# Patient Record
Sex: MALE | Race: WHITE | Employment: UNEMPLOYED | URBAN - NONMETROPOLITAN AREA
[De-identification: names, ages, dates, MRNs, and addresses within clinical notes are randomized per-mention and may not be internally consistent; named-entity substitution may affect disease eponyms.]

---

## 2017-01-04 ENCOUNTER — COMMUNICATION - GICH (OUTPATIENT)
Dept: FAMILY MEDICINE | Facility: OTHER | Age: 62
End: 2017-01-04

## 2017-01-04 DIAGNOSIS — M70.61 TROCHANTERIC BURSITIS OF RIGHT HIP: ICD-10-CM

## 2017-01-25 ENCOUNTER — HOSPITAL ENCOUNTER (OUTPATIENT)
Dept: RADIOLOGY | Facility: OTHER | Age: 62
End: 2017-01-25
Attending: FAMILY MEDICINE

## 2017-01-25 ENCOUNTER — HISTORY (OUTPATIENT)
Dept: FAMILY MEDICINE | Facility: OTHER | Age: 62
End: 2017-01-25

## 2017-01-25 ENCOUNTER — OFFICE VISIT - GICH (OUTPATIENT)
Dept: FAMILY MEDICINE | Facility: OTHER | Age: 62
End: 2017-01-25

## 2017-01-25 DIAGNOSIS — S80.02XA CONTUSION OF LEFT KNEE: ICD-10-CM

## 2017-02-02 ENCOUNTER — COMMUNICATION - GICH (OUTPATIENT)
Dept: FAMILY MEDICINE | Facility: OTHER | Age: 62
End: 2017-02-02

## 2017-02-02 DIAGNOSIS — E11.9 TYPE 2 DIABETES MELLITUS WITHOUT COMPLICATIONS (H): ICD-10-CM

## 2017-02-02 DIAGNOSIS — S80.02XD CONTUSION OF LEFT KNEE, SUBSEQUENT ENCOUNTER: ICD-10-CM

## 2017-02-17 ENCOUNTER — COMMUNICATION - GICH (OUTPATIENT)
Dept: FAMILY MEDICINE | Facility: OTHER | Age: 62
End: 2017-02-17

## 2017-02-22 ENCOUNTER — COMMUNICATION - GICH (OUTPATIENT)
Dept: FAMILY MEDICINE | Facility: OTHER | Age: 62
End: 2017-02-22

## 2017-02-22 DIAGNOSIS — Z00.00 ENCOUNTER FOR GENERAL ADULT MEDICAL EXAMINATION WITHOUT ABNORMAL FINDINGS: ICD-10-CM

## 2017-02-22 DIAGNOSIS — G47.00 INSOMNIA: ICD-10-CM

## 2017-02-22 DIAGNOSIS — I10 ESSENTIAL (PRIMARY) HYPERTENSION: ICD-10-CM

## 2017-02-22 DIAGNOSIS — M51.369 OTHER INTERVERTEBRAL DISC DEGENERATION, LUMBAR REGION: ICD-10-CM

## 2017-02-24 ENCOUNTER — HOSPITAL ENCOUNTER (OUTPATIENT)
Dept: RADIOLOGY | Facility: OTHER | Age: 62
End: 2017-02-24
Attending: NURSE PRACTITIONER

## 2017-02-24 ENCOUNTER — HISTORY (OUTPATIENT)
Dept: FAMILY MEDICINE | Facility: OTHER | Age: 62
End: 2017-02-24

## 2017-02-24 ENCOUNTER — OFFICE VISIT - GICH (OUTPATIENT)
Dept: FAMILY MEDICINE | Facility: OTHER | Age: 62
End: 2017-02-24

## 2017-02-24 ENCOUNTER — COMMUNICATION - GICH (OUTPATIENT)
Dept: FAMILY MEDICINE | Facility: OTHER | Age: 62
End: 2017-02-24

## 2017-02-24 DIAGNOSIS — R06.09 OTHER FORMS OF DYSPNEA: ICD-10-CM

## 2017-02-24 DIAGNOSIS — J98.01 ACUTE BRONCHOSPASM: ICD-10-CM

## 2017-02-24 DIAGNOSIS — J40 BRONCHITIS: ICD-10-CM

## 2017-02-27 ENCOUNTER — COMMUNICATION - GICH (OUTPATIENT)
Dept: FAMILY MEDICINE | Facility: OTHER | Age: 62
End: 2017-02-27

## 2017-02-27 DIAGNOSIS — K21.9 GASTRO-ESOPHAGEAL REFLUX DISEASE WITHOUT ESOPHAGITIS: ICD-10-CM

## 2017-02-28 ENCOUNTER — COMMUNICATION - GICH (OUTPATIENT)
Dept: FAMILY MEDICINE | Facility: OTHER | Age: 62
End: 2017-02-28

## 2017-03-23 ENCOUNTER — COMMUNICATION - GICH (OUTPATIENT)
Dept: FAMILY MEDICINE | Facility: OTHER | Age: 62
End: 2017-03-23

## 2017-03-23 DIAGNOSIS — G47.00 INSOMNIA: ICD-10-CM

## 2017-03-23 DIAGNOSIS — Z29.9 ENCOUNTER FOR PREVENTIVE MEASURE: ICD-10-CM

## 2017-03-23 DIAGNOSIS — Z00.00 ENCOUNTER FOR GENERAL ADULT MEDICAL EXAMINATION WITHOUT ABNORMAL FINDINGS: ICD-10-CM

## 2017-04-03 ENCOUNTER — COMMUNICATION - GICH (OUTPATIENT)
Dept: FAMILY MEDICINE | Facility: OTHER | Age: 62
End: 2017-04-03

## 2017-04-05 ENCOUNTER — AMBULATORY - GICH (OUTPATIENT)
Dept: SCHEDULING | Facility: OTHER | Age: 62
End: 2017-04-05

## 2017-04-06 ENCOUNTER — HISTORY (OUTPATIENT)
Dept: FAMILY MEDICINE | Facility: OTHER | Age: 62
End: 2017-04-06

## 2017-04-06 ENCOUNTER — COMMUNICATION - GICH (OUTPATIENT)
Dept: FAMILY MEDICINE | Facility: OTHER | Age: 62
End: 2017-04-06

## 2017-04-06 ENCOUNTER — OFFICE VISIT - GICH (OUTPATIENT)
Dept: FAMILY MEDICINE | Facility: OTHER | Age: 62
End: 2017-04-06

## 2017-04-06 DIAGNOSIS — F40.240 CLAUSTROPHOBIA: ICD-10-CM

## 2017-04-06 DIAGNOSIS — R29.898 OTHER SYMPTOMS AND SIGNS INVOLVING THE MUSCULOSKELETAL SYSTEM: ICD-10-CM

## 2017-04-12 ENCOUNTER — COMMUNICATION - GICH (OUTPATIENT)
Dept: FAMILY MEDICINE | Facility: OTHER | Age: 62
End: 2017-04-12

## 2017-04-13 ENCOUNTER — COMMUNICATION - GICH (OUTPATIENT)
Dept: FAMILY MEDICINE | Facility: OTHER | Age: 62
End: 2017-04-13

## 2017-04-13 ENCOUNTER — OFFICE VISIT - GICH (OUTPATIENT)
Dept: FAMILY MEDICINE | Facility: OTHER | Age: 62
End: 2017-04-13

## 2017-04-13 ENCOUNTER — HISTORY (OUTPATIENT)
Dept: FAMILY MEDICINE | Facility: OTHER | Age: 62
End: 2017-04-13

## 2017-04-13 DIAGNOSIS — E11.9 TYPE 2 DIABETES MELLITUS WITHOUT COMPLICATIONS (H): ICD-10-CM

## 2017-04-13 DIAGNOSIS — M51.369 OTHER INTERVERTEBRAL DISC DEGENERATION, LUMBAR REGION: ICD-10-CM

## 2017-04-13 DIAGNOSIS — Z00.00 ENCOUNTER FOR GENERAL ADULT MEDICAL EXAMINATION WITHOUT ABNORMAL FINDINGS: ICD-10-CM

## 2017-04-14 ENCOUNTER — COMMUNICATION - GICH (OUTPATIENT)
Dept: FAMILY MEDICINE | Facility: OTHER | Age: 62
End: 2017-04-14

## 2017-04-14 DIAGNOSIS — I10 ESSENTIAL (PRIMARY) HYPERTENSION: ICD-10-CM

## 2017-04-14 DIAGNOSIS — M51.369 OTHER INTERVERTEBRAL DISC DEGENERATION, LUMBAR REGION: ICD-10-CM

## 2017-04-19 ENCOUNTER — COMMUNICATION - GICH (OUTPATIENT)
Dept: FAMILY MEDICINE | Facility: OTHER | Age: 62
End: 2017-04-19

## 2017-04-19 DIAGNOSIS — M51.369 OTHER INTERVERTEBRAL DISC DEGENERATION, LUMBAR REGION: ICD-10-CM

## 2017-05-16 ENCOUNTER — COMMUNICATION - GICH (OUTPATIENT)
Dept: FAMILY MEDICINE | Facility: OTHER | Age: 62
End: 2017-05-16

## 2017-05-16 DIAGNOSIS — K21.9 GASTRO-ESOPHAGEAL REFLUX DISEASE WITHOUT ESOPHAGITIS: ICD-10-CM

## 2017-05-16 DIAGNOSIS — Z00.00 ENCOUNTER FOR GENERAL ADULT MEDICAL EXAMINATION WITHOUT ABNORMAL FINDINGS: ICD-10-CM

## 2017-06-07 ENCOUNTER — COMMUNICATION - GICH (OUTPATIENT)
Dept: FAMILY MEDICINE | Facility: OTHER | Age: 62
End: 2017-06-07

## 2017-06-07 DIAGNOSIS — Z00.00 ENCOUNTER FOR GENERAL ADULT MEDICAL EXAMINATION WITHOUT ABNORMAL FINDINGS: ICD-10-CM

## 2017-06-07 DIAGNOSIS — M70.61 TROCHANTERIC BURSITIS OF RIGHT HIP: ICD-10-CM

## 2017-06-29 ENCOUNTER — COMMUNICATION - GICH (OUTPATIENT)
Dept: FAMILY MEDICINE | Facility: OTHER | Age: 62
End: 2017-06-29

## 2017-06-29 DIAGNOSIS — E11.9 TYPE 2 DIABETES MELLITUS WITHOUT COMPLICATIONS (H): ICD-10-CM

## 2017-06-29 DIAGNOSIS — Z00.00 ENCOUNTER FOR GENERAL ADULT MEDICAL EXAMINATION WITHOUT ABNORMAL FINDINGS: ICD-10-CM

## 2017-06-29 DIAGNOSIS — Z29.9 ENCOUNTER FOR PREVENTIVE MEASURE: ICD-10-CM

## 2017-06-29 DIAGNOSIS — I10 ESSENTIAL (PRIMARY) HYPERTENSION: ICD-10-CM

## 2017-06-29 DIAGNOSIS — K21.9 GASTRO-ESOPHAGEAL REFLUX DISEASE WITHOUT ESOPHAGITIS: ICD-10-CM

## 2017-06-29 DIAGNOSIS — M51.369 OTHER INTERVERTEBRAL DISC DEGENERATION, LUMBAR REGION: ICD-10-CM

## 2017-07-04 ENCOUNTER — COMMUNICATION - GICH (OUTPATIENT)
Dept: FAMILY MEDICINE | Facility: OTHER | Age: 62
End: 2017-07-04

## 2017-07-04 DIAGNOSIS — I10 ESSENTIAL (PRIMARY) HYPERTENSION: ICD-10-CM

## 2017-09-20 ENCOUNTER — COMMUNICATION - GICH (OUTPATIENT)
Dept: FAMILY MEDICINE | Facility: OTHER | Age: 62
End: 2017-09-20

## 2017-09-20 DIAGNOSIS — K21.9 GASTRO-ESOPHAGEAL REFLUX DISEASE WITHOUT ESOPHAGITIS: ICD-10-CM

## 2017-12-28 NOTE — TELEPHONE ENCOUNTER
Patient Information     Patient Name MRN Regan Vincent [id number] Male 1955      Telephone Encounter by Inge Cuevas RN at 7/3/2017  3:40 PM     Author:  Inge Cuevas RN Service:  (none) Author Type:  NURS- Registered Nurse     Filed:  7/3/2017  3:43 PM Encounter Date:  2017 Status:  Signed     :  Inge Cuevas RN (NURS- Registered Nurse)            Office visit in the past 12 months or per provider note.    Last visit with DARIO SCRUGGS was on: 2017 in [facility]  Next visit with DARIO SCRUGGS is on: No future appointment listed with this provider  Next visit with Family Practice is on: No future appointment listed in this department    Max refill for 12 months from last office visit or per provider note.    Biguanides    Office visit in the past 12 months or per provider note.    Last visit with DARIO SCRUGGS was on: 2017 in [facility]  Next visit with DARIO SCRUGGS is on: No future appointment listed with this provider  Next visit with Family Practice is on: No future appointment listed in this department    Lab test requirements:  HgbA1c annually or per provider note.  HEMOGLOBIN A1C MONITORING (POCT)    Date Value   2016 7.1 % (H)   2013 6.7 % NGSP (H)       Max refill for 12 months from last office visit or per provider note.    If taking for polycystic ovary disease, may refill for 12 months.  FU in 3-4 mos after labs per [facility]. Prescription refilled per RN Medication Refill Policy.................... INGE CUEVAS RN ....................  7/3/2017   3:41 PM

## 2017-12-28 NOTE — TELEPHONE ENCOUNTER
Patient Information     Patient Name MRN Regan Vincent [id number] Male 1955      Telephone Encounter by Maude Burgos RN at 2017  8:48 AM     Author:  Maude Burgos RN Service:  (none) Author Type:  NURS- Registered Nurse     Filed:  2017  9:03 AM Encounter Date:  2017 Status:  Signed     :  Maude Burgos RN (NURS- Registered Nurse)            Attempted to contact Patient without success. Noted from office visit on 17, Patient has moved to [place].     Proton Pump Inhibitors    Office visit in the past 12 months or per provider note.    Last visit with DARIO SCRUGGS was on: 2017 in [facility]  Next visit with DARIO SCRUGGS is on: No future appointment listed with this provider  Next visit with Family Practice is on: No future appointment listed in this department    Max refill for 12 months from last office visit or per provider note.    Prescription refilled per RN Medication Refill Policy.................... Maude Burgos RN ....................  2017   9:01 AM          .

## 2018-01-02 NOTE — TELEPHONE ENCOUNTER
Patient Information     Patient Name MRN Regan Vincent [id number] Male 1955      Telephone Encounter by Ange Carr RN at 2017  2:58 PM     Author:  Ange Carr RN Service:  (none) Author Type:  NURS- Registered Nurse     Filed:  2017  3:02 PM Encounter Date:  2017 Status:  Signed     :  Ange Carr RN (NURS- Registered Nurse)            Office visit in the past 12 months or per provider note.    Last visit with DARIO SCRUGGS was on: 10/19/2016 in [facility]  Next visit with DARIO SCRUGGS is on: No future appointment listed with this provider  Next visit with Family Practice is on: No future appointment listed in this department    Lab test requirements:  Annual creatinine.  CREATININE (mg/dL)    Date Value   2016 1.02       Max refill for 12 months from last office visit or per provider note.    Prescription refilled per RN Medication Refill Policy.................... Ange Carr RN ....................  2017   3:02 PM

## 2018-01-03 NOTE — TELEPHONE ENCOUNTER
Patient Information     Patient Name MRN Regan Vincent [id number] Male 1955      Telephone Encounter by Nicole Hou at 2017  8:11 AM     Author:  Nicole Hou Service:  (none) Author Type:  (none)     Filed:  2017  8:13 AM Encounter Date:  2017 Status:  Signed     :  Nicole Hou            Spoke with patient he wanted an antibiotic called into the pharmacy. I did let him know that his PCP is not in clinic today and encouraged him to schedule an appt. His cough sounded very deep, transferred to the appt line. Nicole Hou LPN .......................2017  8:13 AM

## 2018-01-03 NOTE — TELEPHONE ENCOUNTER
Patient Information     Patient Name MRN Sex Regan Cisneros [id number] Male 1955      Telephone Encounter by Ange Carr RN at 2017 12:44 PM     Author:  Ange Carr RN Service:  (none) Author Type:  NURS- Registered Nurse     Filed:  2017 12:55 PM Encounter Date:  2017 Status:  Signed     :  Ange Carr RN (NURS- Registered Nurse)            Nsaids    Office visit in the past 12 months or per provider note.    Last visit with DARIO SCRUGGS was on: 2017 in [facility]  Next visit with DARIO SCRUGGS is on: No future appointment listed with this provider  Next visit with Family Practice is on: No future appointment listed in this department    Max refill for 12 months from last office visit or per provider note.    Diuretic Combinations    Office visit in the past 12 months or per provider note.    Lab test requirements:  Creatinine and Potassium annually, if ordering lab, order BMP.  CREATININE (mg/dL)    Date Value   2016 1.02     POTASSIUM (mmol/L)    Date Value   2016 3.8       Max refill for 12 months from last office visit or per provider note.    Office visit in the past 12 months or per provider note.    Max refill for 12 months from last office visit or per provider note.    Beta Blockers     Office visit in the past 12 months or per provider note.    Max refill for 12 months from last office visit or per provider note.    Patient is due for medication management appointment. Limited refill provided at this time and letter sent for reminder to patient. Prescription refilled per RN Medication Refill Policy.................... Ange Carr RN ....................  2017   12:47 PM

## 2018-01-03 NOTE — TELEPHONE ENCOUNTER
Patient Information     Patient Name MRN Regan Vincent [id number] Male 1955      Telephone Encounter by Ange Carr RN at 2017 10:42 AM     Author:  Ange Carr RN Service:  (none) Author Type:  NURS- Registered Nurse     Filed:  2017 10:44 AM Encounter Date:  2017 Status:  Signed     :  Ange Carr RN (NURS- Registered Nurse)            Proton Pump Inhibitors    Office visit in the past 12 months or per provider note.    Last visit with DARIO SCRUGGS was on: 2017 in [facility]  Next visit with DARIO SCRUGGS is on: No future appointment listed with this provider  Next visit with Family Practice is on: No future appointment listed in this department    Max refill for 12 months from last office visit or per provider note.    Patient is due for medication management appointment. Limited refill provided at this time and letter recently sent for reminder to patient. Prescription refilled per RN Medication Refill Policy.................... Ange Carr RN ....................  2017   10:43 AM

## 2018-01-03 NOTE — TELEPHONE ENCOUNTER
Patient Information     Patient Name MRN Regan Vincent [id number] Male 1955      Telephone Encounter by Gorge Diaz MD at 2017  2:43 PM     Author:  Gorge Diaz MD Service:  (none) Author Type:  Physician     Filed:  2017  2:43 PM Encounter Date:  2017 Status:  Signed     :  Gorge Diaz MD (Physician)            No need, just 1 dose will not really affect the blood.  Can resume aspirin.  Gorge Diaz MD ....................  2017   2:43 PM

## 2018-01-03 NOTE — TELEPHONE ENCOUNTER
Patient Information     Patient Name MRN Sex Regan Cisneros [id number] Male 1955      Telephone Encounter by Verena Wong at 2017  2:55 PM     Author:  Verena Wong Service:  (none) Author Type:  (none)     Filed:  2017  2:56 PM Encounter Date:  2017 Status:  Signed     :  Verena Wong            Patient notified and transferred to the appointment line.  Verena Wong LPN  2017  2:56 PM

## 2018-01-03 NOTE — TELEPHONE ENCOUNTER
Patient Information     Patient Name MRN Sex Regan Cisneros [id number] Male 1955      Telephone Encounter by Verena Wong at 2017 10:48 AM     Author:  Verena Wong Service:  (none) Author Type:  (none)     Filed:  2017 10:49 AM Encounter Date:  2017 Status:  Signed     :  Verena Wong            Patient notified.  Letter was mailed to [facility] per patients request.  Verena Wong LPN  2017  10:49 AM

## 2018-01-03 NOTE — TELEPHONE ENCOUNTER
Patient Information     Patient Name MRN Sex Regan Cisneros [id number] Male 1955      Telephone Encounter by Kristen Jacome at 2017  2:58 PM     Author:  Kristen Jacome Service:  (none) Author Type:  (none)     Filed:  2017  2:58 PM Encounter Date:  2017 Status:  Signed     :  Kristen Jacome            Called Margie Jacome ....................  2017   2:58 PM

## 2018-01-03 NOTE — PATIENT INSTRUCTIONS
Patient Information     Patient Name MRN Regan Vincent [id number] Male 1955      Patient Instructions by Jessica Banerjee NP at 2017 10:30 AM     Author:  Jessica Banerjee NP Service:  (none) Author Type:  PHYS- Nurse Practitioner     Filed:  2017 11:02 AM Encounter Date:  2017 Status:  Signed     :  Jessica Banerjee NP (PHYS- Nurse Practitioner)            Acute Bronchitis   ________________________________________________________________________  KEY POINTS    Acute bronchitis is swelling and irritation of the airways (bronchial tubes) which connect the windpipe to the lungs. Acute bronchitis may also be called a chest cold.    Acute bronchitis often does not need medical treatment. You may be able to treat your symptoms at home. Talk to your healthcare provider if your symptoms are severe or if you have another medical condition such as heart or lung disease or diabetes.    Drink plenty of liquids and rest at home. Ask your healthcare provider what symptoms or problems you should watch for and what to do if you have them.  ________________________________________________________________________  What is acute bronchitis?  Acute bronchitis is swelling and irritation of the airways (bronchial tubes) which connect the windpipe to the lungs. Acute bronchitis usually goes away within a few weeks with treatment. Acute bronchitis may also be called a chest cold.  A different form of bronchitis, called chronic bronchitis, is a long-term condition that causes breathing problems. Chronic bronchitis is one type of chronic obstructive pulmonary disease (COPD) and is usually caused by smoking.  What is the cause?  Acute bronchitis is usually caused by viral infections that affect the lungs. Less often, it may be a bacterial infection.  Acute bronchitis is most common during the winter or when the level of air pollution is high.  People who have a higher risk for bronchitis  include:    Infants, young children, and older adults    Smokers    People with heart disease    People with allergies, asthma, or other lung diseases  What are the symptoms?  Symptoms may include:    A deep cough with yellowish or greenish mucus    Pain in your chest when you breathe deeply or cough    Wheezing or feeling short of breath    Fever and chills    Headache    Body aches  How is it diagnosed?  Your healthcare provider will ask about your symptoms and medical history and examine you. You may have tests, such as:    Chest X-ray    Blood tests  How is it treated?  Acute bronchitis often does not need medical treatment. You may be able to treat your symptoms at home:    Get plenty of rest.    Drink plenty of clear liquids unless your healthcare provider has told you to limit liquids. Water, broth, juice, electrolyte solutions, and non-caffeinated drinks are best. If you have a fever, your body needs more liquid because you can get dehydrated.    Talk to your healthcare provider if your symptoms are severe or if you have heart disease, asthma, chronic bronchitis, kidney disease, diabetes, or another chronic medical problem. You may need to take antibiotic medicines. If you are wheezing, you may need an inhaler medicine to make it easier to breathe.  Most of the time acute bronchitis clears up in several days. Your cough may slowly get better in 1 to 4 weeks. It may take you longer to recover if:    You are a smoker.    You live in an area where air pollution is a problem.    You have a heart or lung disease, including asthma.    You have other health problems.  How can I take care of myself?  Follow the full course of treatment prescribed by your healthcare provider. In addition:    Use a humidifier to put more moisture in the air. Avoid steam vaporizers because they can cause burns. Be sure to keep the humidifier clean, as recommended in the 's instructions. It's important to keep bacteria and  mold from growing in the water container.    Drink plenty of liquids. Ask your healthcare provider about how much liquid you should drink each day.    Take cough medicine if recommended by your healthcare provider. Cover your cough.    Don t smoke, and stay away from others who are smoking.    Avoid breathing dust and chemical fumes.    Get extra rest.    Take nonprescription medicine, such as acetaminophen, ibuprofen, or naproxen to treat pain and fever. Read the label and take as directed. Unless recommended by your healthcare provider, you should not take these medicines for more than 10 days.    Nonsteroidal anti-inflammatory medicines (NSAIDs), such as ibuprofen, naproxen, and aspirin, may cause stomach bleeding and other problems. These risks increase with age.    Acetaminophen may cause liver damage or other problems. Unless recommended by your provider, don't take more than 3000 milligrams (mg) in 24 hours. To make sure you don t take too much, check other medicines you take to see if they also contain acetaminophen. Ask your provider if you need to avoid drinking alcohol while taking this medicine.  Ask your provider:    How and when you will get your test results    How long it will take to recover    If there are activities you should avoid and when you can return to your normal activities    How to take care of yourself at home    What symptoms or problems you should watch for and what to do if you have them  Make sure you know when you should come back for a checkup. Keep all appointments for provider visits or tests.  How can I help prevent acute bronchitis?  To reduce your risk of getting a lung infection:    Wash your hands often and especially after using the restroom, coughing, sneezing, or blowing your nose. Also wash your hands before eating or touching your eyes.    Stay at least 6 feet away from people who are sick, if you can.    Stay indoors as much as possible on high-pollution days.    Take  care of your health. Try to get at least 7 to 9 hours of sleep each night. Eat a healthy diet and try to keep a healthy weight. If you smoke, try to quit. If you want to drink alcohol, ask your healthcare provider how much is safe for you to drink. Learn ways to manage stress. Exercise according to your healthcare provider's instructions.

## 2018-01-03 NOTE — TELEPHONE ENCOUNTER
"Patient Information     Patient Name MRN Regan Vincent [id number] Male 1955      Telephone Encounter by Agnieszka Mckeon at 2017  2:16 PM     Author:  Agnieszka Mckeon Service:  (none) Author Type:  (none)     Filed:  2017  2:28 PM Encounter Date:  2017 Status:  Signed     :  Agnieszka Mckeon            After birth date was verified, states [employer] told him the nebulizer would not be covered.  His insurance will only cover 1 machine every 5 years.  States he will not pay out of pocket for it.  Lost his when he was at the \"group home\" and he has not been able to get it back.  He is just not going to be using his neb treatments.  Advised him to see Rigo Marshall MD to discuss other treatment options.  Refused to make an appointment at this time.  Removed the nebs from his med list.  Agnieszka Mckeon CMA (AAMA)................ 2017 2:27 PM          "

## 2018-01-03 NOTE — NURSING NOTE
Patient Information     Patient Name MRN Regan Vincent [id number] Male 1955      Nursing Note by Verena Wong at 2017  8:00 AM     Author:  Verena Wong Service:  (none) Author Type:  (none)     Filed:  2017  7:57 AM Encounter Date:  2017 Status:  Signed     :  Verena Wong            Patient presents today as he fell at work 1 week ago and injured his left knee.  Verena Wong LPN  2017  7:47 AM

## 2018-01-03 NOTE — PROGRESS NOTES
Patient Information     Patient Name MRN Sex Regan Cisneros [id number] Male 1955      Progress Notes by Rigo Marshall MD at 2017  8:00 AM     Author:  Rigo Marshall MD Service:  (none) Author Type:  Physician     Filed:  2017  8:37 AM Encounter Date:  2017 Status:  Signed     :  Rigo Marshall MD (Physician)            SUBJECTIVE:  Regan Mehta is a 61 y.o. male who presents for evaluation of a work-related left knee injury. He works at [employer]. He tripped at work on  and his left knee gave way. He fell forward landing on his left knee. Since that time has had sharp pains in the knee medially and laterally and the knee feels somewhat swollen. Knee also has been giving out at times. He has had previous arthroscopy of that knee with reinforcement of the patellar tendon following a long bout of prepatellar bursitis.    No Known Allergies and   Current Outpatient Prescriptions on File Prior to Visit       Medication  Sig Dispense Refill     aspirin 325 mg tablet TAKE 1 TABLET BY MOUTH ONCE DAILY. (EVENING) 100 tablet 3     blood sugar diagnostic strip Dispense test strips covered by the patient insurance. Test 2 times per day.  Dx Code: E11.9 100 Each 3     blood-glucose meter Dispense glucose meter, test strips and lancets covered by the patient insurance. Test 2 times per day.  Dx Code: 250.00 1 Device 0     ibuprofen (ADVIL; MOTRIN) 800 mg tablet TAKE 1 TABLET BY MOUTH THREE TIMES DAILY AS NEEDED FOR PAIN 25 tablet 2     lancets Test 2 times daily.  Dx Code: 250.00 100 Each 11     lisinopril-hydrochlorothiazide, 20-25 mg, (PRINZIDE, ZESTORETIC) 20-25 mg per tablet Take 1 tablet by mouth once daily. 90 tablet 3     Melatonin 5 mg tab TAKE 1 TABLET BY MOUTH AT BEDTIME AS NEEDED FOR SLEEP **BOTTLE** 90 tablet 3     metFORMIN (GLUCOPHAGE) 500 mg tablet TAKE 1 TABLET BY MOUTH 2 TIMES DAILY WITH MEALS. (AM,SUPPER) 180 tablet 1     metoprolol succinate  "(TOPROL XL) 100 mg Sustained-Release tablet TAKE 1 TABLET BY MOUTH ONCE DAILY. (AM) 90 tablet 3     nabumetone (RELAFEN) 500 mg tablet Take 1 tablet by mouth 2 times daily with meals. 56 tablet 7     Nebulizer Accessories [facility] As directed. 1 Each 0     Nebulizer Nebulizer, neb kit, neb cup and mask.  Medication: Albuterol neb  For home use. Length of need  for Medicare patients: Indefinite 1 Device 0     omeprazole (PRILOSEC) 20 mg Delayed-Release capsule Take 1 capsule by mouth once daily before a meal. 90 capsule 3     predniSONE (DELTASONE) 20 mg tablet Take 1 tablet by mouth once daily with a meal. 5 tablet 0     TAB-A-SAGE tablet TAKE ONE TABLET BY MOUTH ONCE DAILY (AM) 100 tablet 1     traZODone (DESYREL) 100 mg tablet TAKE 1/2 TO 1 TABLET BY MOUTH AT BEDTIME (BLISTER 1 TABLET AT BEDTIME) 30 tablet 6     VITAMIN D-3 2,000 unit capsule TAKE ONE CAPSULE BY MOUTH ONCE DAILY (AM) 90 capsule 1     Walker - 4 wheels For home use. Length of need: Longterm     4 wheel walker with seat and brakes. 1 Device 0     No current facility-administered medications on file prior to visit.        OBJECTIVE:  /88  Pulse 92  Ht 1.727 m (5' 8\")  Wt 135.6 kg (299 lb)  BMI 45.46 kg/m2  EXAM:  General Appearance: Pleasant, alert, appropriate appearance for age. No acute distress  Musculoskeletal Exam: Left knee appears grossly normal to inspection without obvious effusion. Mild diffuse tenderness noted over the patella. Medial and lateral collateral ligaments appear symmetric. Lachman's and Magui's negative. He has full strength to opposed extension at the knee. He can fully weight-bear on the left leg.    X-ray shows calcification in the patellar tendon region and postoperative changes at the patellar insertion. No fractures identified.    ASSESSMENT/Plan :      ICD-10-CM    1. Contusion of left knee, initial encounter  He will be placed on light duty at work until January 30 at which time he can resume unrestricted work. " Refer to work status report for details. He will follow-up with me in 1 week.  S80.02XA XR KNEE 3 VIEWS AP LAT SUNRISE LEFT       Rigo Marshall MD

## 2018-01-03 NOTE — TELEPHONE ENCOUNTER
Patient Information     Patient Name MRN Regan Vincent [id number] Male 1955      Telephone Encounter by Lilia Boyd at 2017 12:27 PM     Author:  Lilia Boyd Service:  (none) Author Type:  (none)     Filed:  2017 12:27 PM Encounter Date:  2017 Status:  Signed     :  Lilia Boyd MDG- PATIENT IS LOOKING TO GET PATCHES TO HELP HIM QUIT SMOKING.    Lilia Boyd ....................  2017   12:27 PM

## 2018-01-03 NOTE — TELEPHONE ENCOUNTER
Patient Information     Patient Name MRN Sex Regan Cisneros [id number] Male 1955      Telephone Encounter by Ange Carr RN at 2017  1:56 PM     Author:  Ange Carr RN Service:  (none) Author Type:  NURS- Registered Nurse     Filed:  2017  2:01 PM Encounter Date:  2017 Status:  Signed     :  Ange Carr RN (NURS- Registered Nurse)            In clinical absence of patient's primary, Rigo Marshall MD, patient is requesting that this message be sent to the Doc of the Day for consideration please.      Pharmacy sent fax stating they erroneously filled an order for Regan for warfarin 2 mg. This was delivered to him and he did take 1 tablet before he caught the error. The medication was retrieved from the patient and the pharmacist talked with him regarding side effects and what to watch for. He was also told to hold his  mg tab tonight. They are wondering if he should hold his ASA for 2-5 days due to this error. Please contact Gwen Matamoros at [facility] with any questions or further instructions.    Ange Carr RN........2017 2:00 PM

## 2018-01-03 NOTE — TELEPHONE ENCOUNTER
Patient Information     Patient Name MRN Regan Vincent [id number] Male 1955      Telephone Encounter by Verena Wong at 2017 10:24 AM     Author:  Verena Wong Service:  (none) Author Type:  (none)     Filed:  2017 10:26 AM Encounter Date:  2017 Status:  Signed     :  Verena Wong            Patient states that his left knee is not any better. He is requesting a referral to go to [facility] and he would also like a note for work to be able to miss 4-5 days. Please advise.  Verena Wong LPN  2017  10:25 AM

## 2018-01-03 NOTE — TELEPHONE ENCOUNTER
Patient Information     Patient Name MRN Regan Vincent [id number] Male 1955      Telephone Encounter by Robyn Dietrich at 2017  7:40 AM     Author:  Robyn Dietrich Service:  (none) Author Type:  (none)     Filed:  2017  7:41 AM Encounter Date:  2017 Status:  Signed     :  Robyn Dietrich - PT HAS CONCERNS ABOUT BRONCHITIS WOULD LIKE NURSE TO CALL HIM BACK        Robyn HAGEN  ....................  2017   7:41 AM

## 2018-01-03 NOTE — TELEPHONE ENCOUNTER
Patient Information     Patient Name MRN Regan Vincent [id number] Male 1955      Telephone Encounter by Masha Thapa at 2017 10:19 AM     Author:  Masha Thapa Service:  (none) Author Type:  (none)     Filed:  2017 10:20 AM Encounter Date:  2017 Status:  Signed     :  Masha Thapa            Patient is asking for a referral to [facility] for his left knee. He would like a return call.    Masha Thapa ....................  2017   10:20 AM

## 2018-01-03 NOTE — TELEPHONE ENCOUNTER
Patient Information     Patient Name MRN Sex Regan Cisneros [id number] Male 1955      Telephone Encounter by Rigo Marshall MD at 2017 10:38 AM     Author:  Rigo Marshall MD Service:  (none) Author Type:  Physician     Filed:  2017 10:39 AM Encounter Date:  2017 Status:  Signed     :  Rigo Marshall MD (Physician)            Order placed for orthopedic consultation and letter written.

## 2018-01-03 NOTE — TELEPHONE ENCOUNTER
Patient Information     Patient Name MRN Rgean Vincent [id number] Male 1955      Telephone Encounter by Rigo Marshall MD at 2017  2:39 PM     Author:  Rigo Marshall MD Service:  (none) Author Type:  Physician     Filed:  2017  2:39 PM Encounter Date:  2017 Status:  Signed     :  Rigo Marshall MD (Physician)            Patient should be seen.

## 2018-01-03 NOTE — PROGRESS NOTES
Patient Information     Patient Name MRN Sex Regan Cisneros [id number] Male 1955      Progress Notes by Jessica Banerjee NP at 2017 10:30 AM     Author:  Jessica Banerjee NP Service:  (none) Author Type:  PHYS- Nurse Practitioner     Filed:  2017  6:06 PM Encounter Date:  2017 Status:  Signed     :  Jessica Banerjee NP (PHYS- Nurse Practitioner)            Nursing Notes:   Chelle Recinos  2017 10:37 AM  Signed  Patient says he gets bronchitis every year at this time and is requesting a prescription. He also says he needs a new nebulizer .  Chelle Still LPN ....................2017  10:20 AM    SUBJECTIVE:    Regan Mehta is a 61 y.o. male who presents for Cough and congestion    Cough   This is a new problem. The current episode started in the past 7 days. The problem has been unchanged. The cough is productive of sputum. Associated symptoms include myalgias, nasal congestion, postnasal drip, rhinorrhea, shortness of breath and wheezing. Pertinent negatives include no chest pain, ear congestion, ear pain, fever, headaches, sore throat or sweats. Associated symptoms comments: Deep rolling cough, has been without his nebulizer for several months. . Nothing aggravates the symptoms. He has tried nothing for the symptoms. His past medical history is significant for bronchitis. There is no history of environmental allergies or pneumonia.       Current Outpatient Prescriptions on File Prior to Visit       Medication  Sig Dispense Refill     aspirin 325 mg tablet TAKE 1 TABLET BY MOUTH ONCE DAILY. (EVENING) 100 tablet 3     blood sugar diagnostic strip Dispense test strips covered by the patient insurance. Test 2 times per day.  Dx Code: E11.9 100 Each 3     blood-glucose meter Dispense glucose meter, test strips and lancets covered by the patient insurance. Test 2 times per day.  Dx Code: 250.00 1 Device 0     ibuprofen (ADVIL; MOTRIN) 800 mg tablet TAKE 1 TABLET BY  "MOUTH THREE TIMES DAILY AS NEEDED FOR PAIN 25 tablet 2     lancets Test 2 times daily.  Dx Code: 250.00 100 Each 11     lisinopril-hydrochlorothiazide, 20-25 mg, (PRINZIDE, ZESTORETIC) 20-25 mg per tablet Take 1 tablet by mouth once daily. 31 tablet 2     Melatonin 5 mg tab TAKE 1 TABLET BY MOUTH AT BEDTIME AS NEEDED FOR SLEEP **BOTTLE** 90 tablet 3     metFORMIN (GLUCOPHAGE) 500 mg tablet TAKE 1 TABLET BY MOUTH 2 TIMES DAILY WITH MEALS. (AM,SUPPER) 180 tablet 0     metoprolol succinate (TOPROL XL) 100 mg Sustained-Release tablet TAKE 1 TABLET BY MOUTH ONCE DAILY. (AM) 31 tablet 2     multivitamin-folic acid 0.4 mg (TAB-A-SAGE) tablet Take 1 tablet by mouth once daily. 100 tablet 0     nabumetone (RELAFEN) 500 mg tablet Take 1 tablet by mouth 2 times daily with meals. 62 tablet 2     Nebulizer Accessories misc As directed. 1 Each 0     omeprazole (PRILOSEC) 20 mg Delayed-Release capsule Take 1 capsule by mouth once daily before a meal. 90 capsule 3     traZODone (DESYREL) 100 mg tablet TAKE 1/2 TO 1 TABLET BY MOUTH AT BEDTIME (BLISTER 1 TABLET AT BEDTIME) 31 tablet 0     VITAMIN D-3 2,000 unit capsule TAKE ONE CAPSULE BY MOUTH ONCE DAILY (AM) 90 capsule 1     Walker - 4 wheels For home use. Length of need: Longterm     4 wheel walker with seat and brakes. 1 Device 0     No current facility-administered medications on file prior to visit.        REVIEW OF SYSTEMS:  Review of Systems   Constitutional: Negative for fever.   HENT: Positive for postnasal drip and rhinorrhea. Negative for ear pain and sore throat.    Respiratory: Positive for cough, shortness of breath and wheezing.    Cardiovascular: Negative for chest pain.   Musculoskeletal: Positive for myalgias.   Neurological: Negative for headaches.   Endo/Heme/Allergies: Negative for environmental allergies.       OBJECTIVE:  /80  Pulse (!) 110  Temp 97.3  F (36.3  C) (Temporal)  Ht 1.727 m (5' 8\")  Wt 134.3 kg (296 lb)  SpO2 94%  BMI 45.01 kg/m2    EXAM: "   Physical Exam   Constitutional: He is well-developed, well-nourished, and in no distress.   HENT:   Head: Normocephalic and atraumatic.   Right Ear: Tympanic membrane and ear canal normal.   Left Ear: Tympanic membrane and ear canal normal.   Nose: Nose normal.   Mouth/Throat: Uvula is midline, oropharynx is clear and moist and mucous membranes are normal.   Eyes: Conjunctivae are normal.   Neck: Neck supple.   Cardiovascular: Normal rate, regular rhythm and normal heart sounds.    Pulmonary/Chest: Effort normal. He has no decreased breath sounds. He has wheezes in the right upper field and the left upper field. He has no rhonchi. He has no rales.   Lymphadenopathy:     He has no cervical adenopathy.   Nursing note and vitals reviewed.    Completed Chest xray.  I personally reviewed the xray. There was no infiltrates upon initial read of xray.  Final read pending by radiology.    ASSESSMENT/PLAN:    ICD-10-CM    1. Bronchospasm J98.01 Nebulizer      albuterol (PROVENTIL) 0.083 % neb solution      XR CHEST 2 VIEWS PA AND LATERAL      azithromycin (ZITHROMAX) 250 mg tablet   2. JOY (dyspnea on exertion) R06.09 Nebulizer      albuterol (PROVENTIL) 0.083 % neb solution   3. Bronchitis J40 azithromycin (ZITHROMAX) 250 mg tablet      predniSONE (DELTASONE) 20 mg tablet        Plan:  Will reorder his nebulizer machine since his machine is lost. Will also get him on Prednisone and Zithromax, he is prone to Bronchitis. He is a bit of a challenge as he demands abx for all bronchitis. On Clinic check in his sat was 81% but after he sat a bit and rested it came up to 94%. He is obese, likely has obesity induced asthma.   Will treat and if not getting better in a few days F/U.      JOANN VICK NP ....................  2/24/2017   6:06 PM

## 2018-01-03 NOTE — TELEPHONE ENCOUNTER
Patient Information     Patient Name MRN Regan Vincent [id number] Male 1955      Telephone Encounter by Verena Wong at 2017  2:30 PM     Author:  Verena Wong Service:  (none) Author Type:  (none)     Filed:  2017  2:32 PM Encounter Date:  2017 Status:  Signed     :  Verena Wong            Talked with patient and he would like to try patches to quit chewing, not smoking as he does not smoke.  Please advise.  Verena Wong LPN  2017  2:31 PM

## 2018-01-03 NOTE — TELEPHONE ENCOUNTER
Patient Information     Patient Name MRN Regan Vincent [id number] Male 1955      Telephone Encounter by Ange Carr RN at 2/3/2017  7:50 AM     Author:  Ange Carr RN Service:  (none) Author Type:  NURS- Registered Nurse     Filed:  2/3/2017  8:00 AM Encounter Date:  2017 Status:  Signed     :  Ange Carr RN (NURS- Registered Nurse)            Biguanides    Office visit in the past 12 months or per provider note.    Last visit with DARIO SCRUGGS was on: 2017 in [facility]  Next visit with DARIO SCRUGGS is on: No future appointment listed with this provider  Next visit with Family Practice is on: No future appointment listed in this department    Lab test requirements:  HgbA1c annually or per provider note.  HEMOGLOBIN A1C MONITORING (POCT)    Date Value   2016 7.1 % (H)   2013 6.7 % NGSP (H)     HEMOGLOBIN A1C GIH (%)    Date Value   11/15/2011 7.1 (H)       Max refill for 12 months from last office visit or per provider note.    If taking for polycystic ovary disease, may refill for 12 months.    Patient is due for medication management appointment. Limited refill provided at this time and letter sent for reminder to patient. Prescription refilled per RN Medication Refill Policy.................... Ange Carr RN ....................  2/3/2017   7:55 AM

## 2018-01-03 NOTE — TELEPHONE ENCOUNTER
Patient Information     Patient Name MRN Sex Regan Cisneros [id number] Male 1955      Telephone Encounter by Ange Carr RN at 2017 12:54 PM     Author:  Ange Carr RN Service:  (none) Author Type:  NURS- Registered Nurse     Filed:  2017 12:55 PM Encounter Date:  2017 Status:  Signed     :  Ange Carr RN (NURS- Registered Nurse)            This is a Refill request from: [facility]  Name of Medication: Trazodone  Quantity requested: 31  Last fill date: 2016  Due for refill: 2107  Last visit with DARIO SCRUGGS was on: 2017 in [facility]  PCP:  Dario Scruggs MD  Controlled Substance Agreement:  n/a   Diagnosis r/t this medication request: insomnia     Unable to complete prescription refill per RN Medication Refill Policy.................... Ange Carr RN ....................  2017   12:54 PM

## 2018-01-04 NOTE — NURSING NOTE
Patient Information     Patient Name MRN Sex Regan Cisneros [id number] Male 1955      Nursing Note by Verena Wong at 2017 11:00 AM     Author:  Verena Wong Service:  (none) Author Type:  (none)     Filed:  2017 10:02 AM Encounter Date:  2017 Status:  Signed     :  Verena Wong            Patient presents today as a follow-up on left knee. He as seen yesterday by ortho down in [place], [place].  Verena Wong LPN  2017  9:54 AM

## 2018-01-04 NOTE — TELEPHONE ENCOUNTER
Patient Information     Patient Name MRN Sex Regan Cisneros [id number] Male 1955      Telephone Encounter by Anna Sandra at 2017  3:38 PM     Author:  Anna Sandra Service:  (none) Author Type:  (none)     Filed:  2017  3:38 PM Encounter Date:  2017 Status:  Signed     :  Anna Sandra            Patient notified.  Anna Sandra LPN....................2017 3:38 PM

## 2018-01-04 NOTE — TELEPHONE ENCOUNTER
Patient Information     Patient Name MRN Sex Regan Cisneros [id number] Male 1955      Telephone Encounter by Verena Wong at 4/3/2017  1:23 PM     Author:  Verena Wong Service:  (none) Author Type:  (none)     Filed:  4/3/2017  1:28 PM Encounter Date:  4/3/2017 Status:  Signed     :  Verena Wong            Talked with patient and he is wondering if he could go back to work on ?  If so, he will need a letter faxed to his work.  He is out because of his knee and has an upcoming appointment with a specialist in [place]. Please advise.  Verena Wong LPN  4/3/2017  1:26 PM

## 2018-01-04 NOTE — TELEPHONE ENCOUNTER
Patient Information     Patient Name MRN Regan Vincent [id number] Male 1955      Telephone Encounter by Rigo Marshall MD at 2017 10:02 AM     Author:  Rigo Marshall MD Service:  (none) Author Type:  Physician     Filed:  2017 10:02 AM Encounter Date:  2017 Status:  Signed     :  Rigo Marshall MD (Physician)            He was given a note when I saw him 6 days ago.

## 2018-01-04 NOTE — TELEPHONE ENCOUNTER
Patient Information     Patient Name MRN Regan Vincent [id number] Male 1955      Telephone Encounter by Rigo Marshall MD at 2017  3:22 PM     Author:  Rigo Marshall MD Service:  (none) Author Type:  Physician     Filed:  2017  3:22 PM Encounter Date:  2017 Status:  Signed     :  Rigo Marshall MD (Physician)            Done.

## 2018-01-04 NOTE — TELEPHONE ENCOUNTER
Patient Information     Patient Name MRN Regan Vincent [id number] Male 1955      Telephone Encounter by Rigo Marshall MD at 4/3/2017  1:37 PM     Author:  Rigo Marshall MD Service:  (none) Author Type:  Physician     Filed:  4/3/2017  1:37 PM Encounter Date:  4/3/2017 Status:  Signed     :  Rigo Marshall MD (Physician)            Patient would need an appointment to be reevaluated first.

## 2018-01-04 NOTE — TELEPHONE ENCOUNTER
Patient Information     Patient Name MRN Regan Vincent [id number] Male 1955      Telephone Encounter by Ange Carr RN at 2017  9:15 AM     Author:  Ange Carr RN Service:  (none) Author Type:  NURS- Registered Nurse     Filed:  2017  9:21 AM Encounter Date:  2017 Status:  Signed     :  Ange Carr RN (NURS- Registered Nurse)            Patient was sent a letter with last refill that he is due for diabetic management. He has had several visits since, but none addressing diabetes or medications.     Patient is also due for annual labs.     Per visit note from today, patient is moving to the [facility]. Routing refill request to Dario Scruggs MD for consideration.    Diuretic Combinations    Office visit in the past 12 months or per provider note.    Last visit with DARIO SCRUGGS was on: 2017 in [facility]  Next visit with DARIO SCRUGGS is on: No future appointment listed with this provider  Next visit with Family Practice is on: No future appointment listed in this department    Lab test requirements:  Creatinine and Potassium annually, if ordering lab, order BMP.  CREATININE (mg/dL)    Date Value   2016 1.02     POTASSIUM (mmol/L)    Date Value   2016 3.8       Max refill for 12 months from last office visit or per provider note.    Nsaids    Office visit in the past 12 months or per provider note.    Max refill for 12 months from last office visit or per provider note.    Beta Blockers     Max refill for 12 months from last office visit or per provider note.    Unable to complete prescription refill per RN Medication Refill Policy.................... Ange Carr RN ....................  2017   9:21 AM

## 2018-01-04 NOTE — TELEPHONE ENCOUNTER
Patient Information     Patient Name MRN Sex Regan Cisneros [id number] Male 1955      Telephone Encounter by Verena Wong at 4/3/2017  2:07 PM     Author:  Verena Wong Service:  (none) Author Type:  (none)     Filed:  4/3/2017  2:08 PM Encounter Date:  4/3/2017 Status:  Signed     :  Verena Wong            Appointment made.  Verena Wong LPN  4/3/2017  2:07 PM

## 2018-01-04 NOTE — PROGRESS NOTES
Patient Information     Patient Name MRN Sex Regan Cisneros [id number] Male 1955      Progress Notes by Rigo Marshall MD at 2017 11:00 AM     Author:  Rigo Marshall MD Service:  (none) Author Type:  Physician     Filed:  2017 10:34 AM Encounter Date:  2017 Status:  Signed     :  Rigo Marshall MD (Physician)            SUBJECTIVE:  Regan Mehta is a 61 y.o. male who presents for follow-up of left leg weakness with occasional buckling of the knee. He reports being evaluated in [place] yesterday. He apparently had a knee MRI which was unremarkable and then saw his neurosurgeon Dr. Nicanor Colon. Dr. Colon is concerned that he has ongoing nerve damage in the left leg and he is scheduled for a lumbar MRI in [place]. He continues to be off of work and needs a note from me indicating that he is not able to work for the time being.    No Known Allergies and   Current Outpatient Prescriptions on File Prior to Visit       Medication  Sig Dispense Refill     aspirin 325 mg tablet TAKE 1 TABLET BY MOUTH ONCE DAILY. (EVENING) 100 tablet 1     blood sugar diagnostic strip Dispense test strips covered by the patient insurance. Test 2 times per day.  Dx Code: E11.9 100 Each 3     blood-glucose meter Dispense glucose meter, test strips and lancets covered by the patient insurance. Test 2 times per day.  Dx Code: 250.00 1 Device 0     ibuprofen (ADVIL; MOTRIN) 800 mg tablet TAKE 1 TABLET BY MOUTH THREE TIMES DAILY AS NEEDED FOR PAIN 25 tablet 2     lancets Test 2 times daily.  Dx Code: 250.00 100 Each 11     lisinopril-hydrochlorothiazide, 20-25 mg, (PRINZIDE, ZESTORETIC) 20-25 mg per tablet Take 1 tablet by mouth once daily. 31 tablet 2     Melatonin 5 mg tab TAKE 1 TABLET BY MOUTH AT BEDTIME AS NEEDED FOR SLEEP **BOTTLE** 90 tablet 3     metFORMIN (GLUCOPHAGE) 500 mg tablet TAKE 1 TABLET BY MOUTH 2 TIMES DAILY WITH MEALS. (AM,SUPPER) 180 tablet 0     metoprolol succinate (TOPROL  XL) 100 mg Sustained-Release tablet TAKE 1 TABLET BY MOUTH ONCE DAILY. (AM) 31 tablet 2     nabumetone (RELAFEN) 500 mg tablet Take 1 tablet by mouth 2 times daily with meals. 62 tablet 2     omeprazole (PRILOSEC) 20 mg Delayed-Release capsule TAKE 1 CAPSULE BY MOUTH ONCE DAILY BEFORE A MEAL 90 capsule 0     TAB-A-SAGE tablet TAKE ONE TABLET BY MOUTH ONCE DAILY (AM) 100 tablet 1     traZODone (DESYREL) 100 mg tablet TAKE 1/2 TO 1 TABLET BY MOUTH AT BEDTIME (BLISTER 1 TABLET AT BEDTIME) 31 tablet 11     VITAMIN D-3 2,000 unit capsule TAKE ONE CAPSULE BY MOUTH ONCE DAILY (AM) 90 capsule 1     Walker - 4 wheels For home use. Length of need: Longterm     4 wheel walker with seat and brakes. 1 Device 0     No current facility-administered medications on file prior to visit.        OBJECTIVE:  /76  Pulse 76  Wt 135.2 kg (298 lb)  BMI 45.31 kg/m2  EXAM:  General Appearance: Pleasant, alert, appropriate appearance for age. No acute distress.    ASSESSMENT/Plan :      ICD-10-CM    1. Left leg weakness  Note given to the patient indicating he is unable to work until neurosurgical evaluation and possible treatment is completed.  M62.81      15 minutes spent in face-to-face interaction with patient with the majority of time spent in counseling and care coordination.      Rigo Marshall MD

## 2018-01-04 NOTE — TELEPHONE ENCOUNTER
Patient Information     Patient Name MRN Regan Vincent [id number] Male 1955      Telephone Encounter by Verena Wong at 4/3/2017 10:29 AM     Author:  Verena Wong Service:  (none) Author Type:  (none)     Filed:  4/3/2017 10:29 AM Encounter Date:  4/3/2017 Status:  Signed     :  Verena Wong            Left message to call back  ....................  4/3/2017   10:29 AM  Verena Wong LPN  4/3/2017  10:29 AM

## 2018-01-04 NOTE — TELEPHONE ENCOUNTER
Patient Information     Patient Name MRN Regan Vincent [id number] Male 1955      Telephone Encounter by Ange Carr RN at 2017  2:24 PM     Author:  Ange Carr RN Service:  (none) Author Type:  NURS- Registered Nurse     Filed:  2017  2:34 PM Encounter Date:  2017 Status:  Signed     :  Ange Carr RN (NURS- Registered Nurse)            Patient was sent a letter with last refill that he is due for diabetic management. He has had several visits since, but none addressing diabetes. Per visit note from today, patient is moving to the [facility]. Routing refill request to Dario Scruggs MD for consideration.    This is a Refill request from: [facility]  Name of Medication: Metformin  Quantity requested: 180  Last fill date: 2/3/2017  Due for refill: yes  Last visit with DARIO SCRUGGS was on: 2017 in [facility]  PCP:  Dario Scruggs MD  Controlled Substance Agreement:  n/a   Diagnosis r/t this medication request: Diabetes    Name of Medication: Vitamin D-3  Quantity requested: 90  Last fill date: 2016  Due for refill: yes  Last visit with DARIO SCRUGSG was on: 2017 in [facility]  PCP:  Dario Scruggs MD  Controlled Substance Agreement:  n/a   Diagnosis r/t this medication request: health care maintenance     Unable to complete prescription refill per RN Medication Refill Policy.................... Ange Carr RN ....................  2017   2:32 PM

## 2018-01-04 NOTE — TELEPHONE ENCOUNTER
Patient Information     Patient Name MRN Regan Vincent [id number] Male 1955      Telephone Encounter by Robyn Dietrich at 4/3/2017  9:27 AM     Author:  Robyn Dietrich Service:  (none) Author Type:  (none)     Filed:  4/3/2017  9:28 AM Encounter Date:  4/3/2017 Status:  Signed     :  Robyn Dietrich            MDG - PT WOULD LIKE TO TALK TO JOSE ALEJANDRO WOULD NOT DISCLOSE WHY JUST WANTS TO TALK TO HER.      Robyn HAGEN  ....................  4/3/2017   9:27 AM

## 2018-01-04 NOTE — PROGRESS NOTES
Patient Information     Patient Name MRN Sex Regan Cisneros [id number] Male 1955      Progress Notes by Rigo Marshall MD at 2017  1:30 PM     Author:  Rigo Marshall MD Service:  (none) Author Type:  Physician     Filed:  2017  1:00 PM Encounter Date:  2017 Status:  Signed     :  Rigo Marshall MD (Physician)            SUBJECTIVE:  Regan Mehta is a 61 y.o. male who presents requesting a note from me indicating his ability to work. He continues to have issues with left leg weakness with occasional buckling of the knee. He is in the process of being evaluated by neurosurgery however because of family circumstances has decided to move to the [facility]. He reports being on probation with a requirement of some additional community service here. He is wondering if I will write a note regarding his current work capabilities.    No Known Allergies and   Current Outpatient Prescriptions on File Prior to Visit       Medication  Sig Dispense Refill     aspirin 325 mg tablet TAKE 1 TABLET BY MOUTH ONCE DAILY. (EVENING) 100 tablet 1     blood sugar diagnostic strip Dispense test strips covered by the patient insurance. Test 2 times per day.  Dx Code: E11.9 100 Each 3     blood-glucose meter Dispense glucose meter, test strips and lancets covered by the patient insurance. Test 2 times per day.  Dx Code: 250.00 1 Device 0     ibuprofen (ADVIL; MOTRIN) 800 mg tablet TAKE 1 TABLET BY MOUTH THREE TIMES DAILY AS NEEDED FOR PAIN 25 tablet 2     lancets Test 2 times daily.  Dx Code: 250.00 100 Each 11     lisinopril-hydrochlorothiazide, 20-25 mg, (PRINZIDE, ZESTORETIC) 20-25 mg per tablet Take 1 tablet by mouth once daily. 31 tablet 2     LORazepam (ATIVAN) 0.5 mg tab Take 1 tablet by mouth one half hour prior to imaging test. May repeat once if needed. 2 tablet 0     Melatonin 5 mg tab TAKE 1 TABLET BY MOUTH AT BEDTIME AS NEEDED FOR SLEEP **BOTTLE** 90 tablet 3      metFORMIN (GLUCOPHAGE) 500 mg tablet TAKE 1 TABLET BY MOUTH 2 TIMES DAILY WITH MEALS. (AM,SUPPER) 180 tablet 0     metoprolol succinate (TOPROL XL) 100 mg Sustained-Release tablet TAKE 1 TABLET BY MOUTH ONCE DAILY. (AM) 31 tablet 2     nabumetone (RELAFEN) 500 mg tablet Take 1 tablet by mouth 2 times daily with meals. 62 tablet 2     omeprazole (PRILOSEC) 20 mg Delayed-Release capsule TAKE 1 CAPSULE BY MOUTH ONCE DAILY BEFORE A MEAL 90 capsule 0     TAB-A-SAGE tablet TAKE ONE TABLET BY MOUTH ONCE DAILY (AM) 100 tablet 1     traZODone (DESYREL) 100 mg tablet TAKE 1/2 TO 1 TABLET BY MOUTH AT BEDTIME (BLISTER 1 TABLET AT BEDTIME) 31 tablet 11     VITAMIN D-3 2,000 unit capsule TAKE ONE CAPSULE BY MOUTH ONCE DAILY (AM) 90 capsule 1     Walker - 4 wheels For home use. Length of need: Longterm     4 wheel walker with seat and brakes. 1 Device 0     No current facility-administered medications on file prior to visit.        OBJECTIVE:  /94  Pulse 92  EXAM:  General Appearance: Pleasant, alert, appropriate appearance for age. No acute distress    ASSESSMENT/Plan :      ICD-10-CM    1. DDD (degenerative disc disease), lumbar  Note completed and given to patient.  M51.36      15 minutes spent in face-to-face interaction with patient with the majority of time spent in counseling and care coordination.      Rigo Marshall MD

## 2018-01-04 NOTE — NURSING NOTE
Patient Information     Patient Name MRN Regan Vincent [id number] Male 1955      Nursing Note by Gia Velez at 2017  1:30 PM     Author:  Gia Velez Service:  (none) Author Type:  (none)     Filed:  2017 12:53 PM Encounter Date:  2017 Status:  Signed     :  Gia Velez            Regan states he needs a letter stating he can't work anymore because his knees are so bad. He is moving in a month.  Gia Velez LPN............................... 2017 12:47 PM

## 2018-01-04 NOTE — TELEPHONE ENCOUNTER
Patient Information     Patient Name MRN Sex Regan Cisneros [id number] Male 1955      Telephone Encounter by Verena Wong at 2017  1:15 PM     Author:  Verena Wong Service:  (none) Author Type:  (none)     Filed:  2017  1:15 PM Encounter Date:  2017 Status:  Signed     :  Verena Wong            Rx faxed to [facility]. Patient aware.  Verena Wong LPN  2017  1:15 PM

## 2018-01-04 NOTE — TELEPHONE ENCOUNTER
Patient Information     Patient Name MRN Sex Regan Cisneros [id number] Male 1955      Telephone Encounter by Maude Monteiro RN at 3/23/2017  1:52 PM     Author:  Maude Monteiro RN Service:  (none) Author Type:  NURS- Registered Nurse     Filed:  3/23/2017  1:58 PM Encounter Date:  3/23/2017 Status:  Signed     :  Maude Monteiro RN (NURS- Registered Nurse)            This is a Refill request from: [facility]  Name of Medication: trazodone  Quantity requested: 31  Last fill date: 17  Last visit with DARIO SCRUGGS was on: 2017 in [facility]  PCP:  Dario Scruggs MD  Controlled Substance Agreement:  None found   Diagnosis r/t this medication request: Insomnia, unspecified type     Unable to complete prescription refill per RN Medication Refill Policy.................... Maude Monteiro RN ....................  3/23/2017   1:55 PM      Office visit in the past 12 months or per provider note.    Last visit with DARIO SCRUGGS was on: 2017 in [facility]  Next visit with DARIO SCRUGGS is on: No future appointment listed with this provider  Next visit with Family Practice is on: No future appointment listed in this department    Max refill for 12 months from last office visit or per provider note.    Office visit in the past 12 months or per provider note.    Max refill for 12 months from last office visit or per provider note.    Prescription refilled per RN Medication Refill Policy.................... Maude Monteiro RN ....................  3/23/2017   1:57 PM

## 2018-01-04 NOTE — TELEPHONE ENCOUNTER
Patient Information     Patient Name MRN Regan Vincent [id number] Male 1955      Telephone Encounter by Tiana Guillen at 2017  3:11 PM     Author:  Tiana Guillen Service:  (none) Author Type:  (none)     Filed:  2017  3:13 PM Encounter Date:  2017 Status:  Signed     :  Tiana Guillen            Patient calling for an order for a bath Chair for his shower. States [facility] knows his legs are weak and bad.   Tiana Guillen LPN ..........2017 3:13 PM

## 2018-01-04 NOTE — TELEPHONE ENCOUNTER
Patient Information     Patient Name MRN Sex Regan Cisneros [id number] Male 1955      Telephone Encounter by Agnieszka Mckeon at 2017 10:10 AM     Author:  Agnieszka Mckeon Service:  (none) Author Type:  (none)     Filed:  2017 10:14 AM Encounter Date:  2017 Status:  Signed     :  Agnieszka Mckeon            After birth date was verified, told the patient he has a letter that will keep him off of work until he gets his knee evaluated further.  He said he wants a note that states he can retire.  Tried to tell him that he doesn't need a note from Rigo Marshall MD to retire but he insisted.  Told him he should follow through with his referrals, but he said he was not going to do that at this time.  Advised to make an appointment to discuss this with Rigo Marshall MD.  Transferred to the appointment desk.  Agnieszka Mckeon [facility] ([facility])................ 2017 10:14 AM

## 2018-01-04 NOTE — TELEPHONE ENCOUNTER
Patient Information     Patient Name MRN Sex Regan Cisneros [id number] Male 1955      Telephone Encounter by Agnieszka Mckeon at 2017  9:27 AM     Author:  Agnieszka Mckeon Service:  (none) Author Type:  (none)     Filed:  2017  9:31 AM Encounter Date:  2017 Status:  Signed     :  Agnieszka Mckeon            Patient is requesting a note to remain off of work.  Does he need to be seen?  Agnieszka Mckeon CMA ([facility])................ 2017 9:31 AM

## 2018-01-05 NOTE — TELEPHONE ENCOUNTER
Patient Information     Patient Name MRN Regan Vincent [id number] Male 1955      Telephone Encounter by Marielle Mireles RN at 2017 12:31 PM     Author:  Marielle Mireles RN Service:  (none) Author Type:  (none)     Filed:  2017 12:34 PM Encounter Date:  2017 Status:  Signed     :  Marielle Mireles RN (NURS- Registered Nurse)            Vitamin D Over the Counter only    Office visit in the past 12 months or per provider note.    Last visit with DARIO SCRUGGS was on: 2017 in [facility]  Next visit with DARIO SCRUGGS is on: No future appointment listed with this provider  Next visit with Family Practice is on: No future appointment listed in this department    Max refill for 12 months from last office visit or per provider note.    Proton Pump Inhibitors    Office visit in the past 12 months or per provider note.    Last visit with DARIO SCRUGGS was on: 2017 in [facility]  Next visit with DARIO SCRUGGS is on: No future appointment listed with this provider  Next visit with Family Practice is on: No future appointment listed in this department    Max refill for 12 months from last office visit or per provider note.    Patient is due for medication management appointment. Limited refill provided at this time and letter sent for reminder to patient. Prescription refilled per RN Medication Refill Policy.................... Marielle Mireles ....................  2017   12:33 PM

## 2018-01-27 VITALS
BODY MASS INDEX: 45.31 KG/M2 | SYSTOLIC BLOOD PRESSURE: 114 MMHG | HEART RATE: 76 BPM | WEIGHT: 298 LBS | DIASTOLIC BLOOD PRESSURE: 76 MMHG

## 2018-01-27 VITALS
TEMPERATURE: 97.3 F | BODY MASS INDEX: 44.86 KG/M2 | OXYGEN SATURATION: 94 % | DIASTOLIC BLOOD PRESSURE: 80 MMHG | HEART RATE: 92 BPM | SYSTOLIC BLOOD PRESSURE: 154 MMHG | HEART RATE: 110 BPM | WEIGHT: 296 LBS | SYSTOLIC BLOOD PRESSURE: 140 MMHG | DIASTOLIC BLOOD PRESSURE: 94 MMHG | HEIGHT: 68 IN

## 2018-01-27 VITALS
HEART RATE: 92 BPM | WEIGHT: 299 LBS | SYSTOLIC BLOOD PRESSURE: 124 MMHG | HEIGHT: 68 IN | DIASTOLIC BLOOD PRESSURE: 88 MMHG | BODY MASS INDEX: 45.31 KG/M2

## 2018-02-02 ENCOUNTER — DOCUMENTATION ONLY (OUTPATIENT)
Dept: FAMILY MEDICINE | Facility: OTHER | Age: 63
End: 2018-02-02

## 2018-02-02 PROBLEM — E11.9 DIABETES MELLITUS, TYPE II (H): Status: ACTIVE | Noted: 2018-02-02

## 2018-02-02 PROBLEM — E66.01 MORBID OBESITY (H): Status: ACTIVE | Noted: 2018-02-02

## 2018-02-02 PROBLEM — G47.00 INSOMNIA: Status: ACTIVE | Noted: 2018-02-02

## 2018-02-02 PROBLEM — K21.9 ESOPHAGEAL REFLUX: Status: ACTIVE | Noted: 2018-02-02

## 2018-02-02 PROBLEM — I10 HYPERTENSION: Status: ACTIVE | Noted: 2018-02-02

## 2018-02-02 PROBLEM — M48.02 SPINAL STENOSIS IN CERVICAL REGION: Status: ACTIVE | Noted: 2018-02-02

## 2018-02-02 PROBLEM — Z86.12 PERSONAL HISTORY OF POLIOMYELITIS: Status: ACTIVE | Noted: 2018-02-02

## 2018-02-02 RX ORDER — NABUMETONE 500 MG/1
500 TABLET, FILM COATED ORAL 2 TIMES DAILY WITH MEALS
COMMUNITY
Start: 2017-04-17

## 2018-02-02 RX ORDER — LISINOPRIL AND HYDROCHLOROTHIAZIDE 20; 25 MG/1; MG/1
1 TABLET ORAL EVERY MORNING
COMMUNITY
Start: 2017-04-17

## 2018-02-02 RX ORDER — ASPIRIN 325 MG
1 TABLET ORAL EVERY EVENING
COMMUNITY
Start: 2017-07-03

## 2018-02-02 RX ORDER — MULTIVITAMIN WITH FOLIC ACID 400 MCG
1 TABLET ORAL DAILY
COMMUNITY
Start: 2017-03-23

## 2018-02-02 RX ORDER — LORAZEPAM 0.5 MG/1
1 TABLET ORAL
COMMUNITY
Start: 2017-04-06

## 2018-02-02 RX ORDER — BLOOD-GLUCOSE METER
EACH MISCELLANEOUS
COMMUNITY
Start: 2015-02-16

## 2018-02-02 RX ORDER — TRAZODONE HYDROCHLORIDE 100 MG/1
.5-1 TABLET ORAL AT BEDTIME
COMMUNITY
Start: 2017-03-23

## 2018-02-02 RX ORDER — IBUPROFEN 800 MG/1
1 TABLET, FILM COATED ORAL 3 TIMES DAILY PRN
COMMUNITY
Start: 2017-01-04

## 2018-02-02 RX ORDER — METOPROLOL SUCCINATE 100 MG/1
1 TABLET, EXTENDED RELEASE ORAL EVERY MORNING
COMMUNITY
Start: 2017-04-17

## 2018-07-23 NOTE — PROGRESS NOTES
Patient Information     Patient Name  Regan Mehta MRN  [id number] Sex  Male   1955      Letter by Rigo Marshall MD at      Author:  Rigo Marshall MD Service:  (none) Author Type:  (none)    Filed:   Encounter Date:  2017 Status:  (Other)           Regan Mehta  [address]  [place] [address]          2017    Dear Mr. Mehta:    This letter is to remind you that you are due for your annual exam with Rigo Marshall MD. Your last comprehensive visit was more than 12 months ago.    LIMITED refills of       lisinopril-hydrochlorothiazide, 20-25 mg, (PRINZIDE, ZESTORETIC) 20-25 mg per tablet      metoprolol succinate (TOPROL XL) 100 mg Sustained-Release tablet      nabumetone (RELAFEN) 500 mg tablet      multivitamin-folic acid 0.4 mg (TAB-A-SAGE) tablet      traZODone (DESYREL) 100 mg tablet   have been called into your pharmacy. Additional refills require you to complete this appointment.    Please call the clinic at [phone] to schedule your appointment.    Thank you for choosing [facility] and [facility] for your health care needs.    Sincerely,    Refill RN  [facility]

## 2018-07-23 NOTE — PROGRESS NOTES
Patient Information     Patient Name  Regan Mehta MRN  [id number] Sex  Male   1955      Letter by Rigo Marshall MD at      Author:  Rigo Marshall MD Service:  (none) Author Type:  (none)    Filed:   Encounter Date:  2017 Status:  (Other)           Regan Mehta  [address]  [place] [address]          2017      CERTIFICATE TO RETURN TO WORK OR SCHOOL      Regan Mehta has been under my care for leg weakness. He is in the process of undergoing further evaluation by neurosurgery and is unable to work at this time. This evaluation may lead to eventual back surgery.    Sincerely,          Rigo Marshall MD

## 2018-07-24 NOTE — PROGRESS NOTES
Patient Information     Patient Name  Regan Mehta MRN  [id number] Sex  Male   1955      Letter by Rigo Marshall MD at      Author:  Rigo Marshall MD Service:  (none) Author Type:  (none)    Filed:   Encounter Date:  2017 Status:  (Other)           Regan Mehta  [address]  [place] [address]          2017      CERTIFICATE TO RETURN TO WORK OR SCHOOL      Regan Mehta was seen recently for a work-related left knee contusion. He attempted to return to work but has been noting continued knee pain.    He has an appointment upcoming with his orthopedic surgeon. I would recommend he not work until that evaluation is complete.    Sincerely,          Rigo Marshall MD

## 2018-07-24 NOTE — PROGRESS NOTES
Patient Information     Patient Name  Regan Mehta MRN  [id number] Sex  Male   1955      Letter by Rigo Marshall MD at      Author:  Rigo Marshall MD Service:  (none) Author Type:  (none)    Filed:   Encounter Date:  2017 Status:  (Other)           Regan Mehta  [address]  [facility] [address]          February 3, 2017    Dear Mr. Mehta:    This letter is to remind you that you are due for your annual exam with Rigo Marshall MD. Your last comprehensive visit was more than 12 months ago.    A LIMITED refill of metFORMIN (GLUCOPHAGE) 500 mg tablet has been called into your pharmacy. Additional refills require you to complete this appointment.    Please call the clinic at [phone] to schedule your appointment.    Thank you for choosing [facility] and [facility] for your health care needs.    Sincerely,    Refill RN  [facility]

## 2018-07-24 NOTE — PROGRESS NOTES
Patient Information     Patient Name  Regan Mehta MRN  [id number] Sex  Male   1955      Letter by Rigo Marshall MD at      Author:  Rigo Marshall MD Service:  (none) Author Type:  (none)    Filed:   Encounter Date:  2017 Status:  (Other)           Regan Mehta  [address]  [place] [address]          2017      CERTIFICATE TO RETURN TO WORK OR SCHOOL      Regan Mehta has been under my care for back and leg issues.  He is unsteady on his feet with a significant risk of falling. It is my opinion that he cannot be gainfully employed at this time and for the foreseeable future.     Sincerely,          Rigo Marshall MD

## 2018-07-24 NOTE — PROGRESS NOTES
Patient Information     Patient Name  Regan Mehta MRN  [id number] Sex  Male   1955      Letter by Rigo Marhsall MD at      Author:  Rigo Marshall MD Service:  (none) Author Type:  (none)    Filed:   Encounter Date:  2017 Status:  (Other)           Regan Mehta  [address]  [place] [address]          May 17, 2017    Dear Mr. Mehta:    This letter is to remind you that you are due for your annual exam with Rigo Marshall MD . Your last comprehensive medication visit was more than 12 months ago.     A LIMITED refill of VITAMIN D-3 2,000 unit capsule and omeprazole (PRILOSEC) 20 mg Delayed-Release capsule has been called into your pharmacy.    Additional refills require an annual medication management appointment with Rigo Marshall MD. Please call the clinic at [phone] to schedule your appointment.    Please call to inform us if you no longer see Rigo Marshall MD for primary care, doing so will remove you from our call/contact list.    Thank you,    The Refill Nurse  [facility]

## 2023-08-28 NOTE — NURSING NOTE
Patient Information     Patient Name MRN Sex Regan Cisneros [id number] Male 1955      Nursing Note by Chelle Recinos at 2017 10:30 AM     Author:  Chelle Recinos Service:  (none) Author Type:  (none)     Filed:  2017 10:37 AM Encounter Date:  2017 Status:  Signed     :  Chelle Recinos            Patient says he gets bronchitis every year at this time and is requesting a prescription. He also says he needs a new nebulizer .  Chelle Recinos LPN ....................2017  10:20 AM             Resulted